# Patient Record
Sex: FEMALE | Race: WHITE | NOT HISPANIC OR LATINO | Employment: OTHER | URBAN - METROPOLITAN AREA
[De-identification: names, ages, dates, MRNs, and addresses within clinical notes are randomized per-mention and may not be internally consistent; named-entity substitution may affect disease eponyms.]

---

## 2017-10-11 ENCOUNTER — HOSPITAL ENCOUNTER (OUTPATIENT)
Dept: RADIOLOGY | Facility: MEDICAL CENTER | Age: 77
End: 2017-10-11
Attending: UROLOGY
Payer: MEDICARE

## 2017-10-11 ENCOUNTER — HOSPITAL ENCOUNTER (OUTPATIENT)
Dept: RADIOLOGY | Facility: MEDICAL CENTER | Age: 77
End: 2017-10-11
Attending: INTERNAL MEDICINE
Payer: MEDICARE

## 2017-10-11 DIAGNOSIS — Z12.31 SCREENING MAMMOGRAM, ENCOUNTER FOR: ICD-10-CM

## 2017-10-11 DIAGNOSIS — R31.0 GROSS HEMATURIA: ICD-10-CM

## 2017-10-11 PROCEDURE — 74176 CT ABD & PELVIS W/O CONTRAST: CPT

## 2017-10-11 PROCEDURE — G0202 SCR MAMMO BI INCL CAD: HCPCS

## 2018-10-16 ENCOUNTER — HOSPITAL ENCOUNTER (OUTPATIENT)
Dept: RADIOLOGY | Facility: MEDICAL CENTER | Age: 78
End: 2018-10-16
Attending: INTERNAL MEDICINE
Payer: MEDICARE

## 2018-10-16 DIAGNOSIS — Z12.31 BREAST CANCER SCREENING BY MAMMOGRAM: ICD-10-CM

## 2018-10-16 PROCEDURE — 77067 SCR MAMMO BI INCL CAD: CPT

## 2019-10-18 ENCOUNTER — HOSPITAL ENCOUNTER (OUTPATIENT)
Dept: RADIOLOGY | Facility: MEDICAL CENTER | Age: 79
End: 2019-10-18
Attending: INTERNAL MEDICINE
Payer: MEDICARE

## 2019-10-18 DIAGNOSIS — Z12.31 VISIT FOR SCREENING MAMMOGRAM: ICD-10-CM

## 2019-10-18 PROCEDURE — 77063 BREAST TOMOSYNTHESIS BI: CPT

## 2020-06-10 ENCOUNTER — OFFICE VISIT (OUTPATIENT)
Dept: MEDICAL GROUP | Facility: MEDICAL CENTER | Age: 80
End: 2020-06-10
Payer: MEDICARE

## 2020-06-10 VITALS
OXYGEN SATURATION: 93 % | BODY MASS INDEX: 28.24 KG/M2 | SYSTOLIC BLOOD PRESSURE: 126 MMHG | HEART RATE: 68 BPM | TEMPERATURE: 98 F | WEIGHT: 175.71 LBS | HEIGHT: 66 IN | DIASTOLIC BLOOD PRESSURE: 64 MMHG

## 2020-06-10 DIAGNOSIS — K76.9 LIVER LESION: ICD-10-CM

## 2020-06-10 DIAGNOSIS — G90.9 AUTONOMIC NEUROPATHY: ICD-10-CM

## 2020-06-10 DIAGNOSIS — L50.1 IDIOPATHIC URTICARIA: ICD-10-CM

## 2020-06-10 DIAGNOSIS — E06.3 HASHIMOTO'S THYROIDITIS: ICD-10-CM

## 2020-06-10 DIAGNOSIS — M15.9 PRIMARY OSTEOARTHRITIS INVOLVING MULTIPLE JOINTS: ICD-10-CM

## 2020-06-10 DIAGNOSIS — N18.30 CHRONIC KIDNEY DISEASE, STAGE 3: ICD-10-CM

## 2020-06-10 DIAGNOSIS — D83.9 CVID (COMMON VARIABLE IMMUNODEFICIENCY) (HCC): ICD-10-CM

## 2020-06-10 PROCEDURE — 99204 OFFICE O/P NEW MOD 45 MIN: CPT | Performed by: INTERNAL MEDICINE

## 2020-06-10 RX ORDER — VITS A,C,E/LUTEIN/MINERALS 300MCG-200
1 TABLET ORAL DAILY
COMMUNITY
End: 2021-06-10

## 2020-06-10 RX ORDER — LEVOTHYROXINE SODIUM 88 MCG
TABLET ORAL
COMMUNITY
Start: 2020-05-06 | End: 2020-10-19 | Stop reason: SDUPTHER

## 2020-06-10 RX ORDER — ACETAMINOPHEN 325 MG/1
650 TABLET ORAL EVERY 4 HOURS PRN
COMMUNITY
End: 2022-05-12

## 2020-06-10 SDOH — HEALTH STABILITY: MENTAL HEALTH: HOW OFTEN DO YOU HAVE A DRINK CONTAINING ALCOHOL?: 4 OR MORE TIMES A WEEK

## 2020-06-10 SDOH — HEALTH STABILITY: MENTAL HEALTH: HOW MANY STANDARD DRINKS CONTAINING ALCOHOL DO YOU HAVE ON A TYPICAL DAY?: 1 OR 2

## 2020-06-10 SDOH — HEALTH STABILITY: MENTAL HEALTH: HOW OFTEN DO YOU HAVE 6 OR MORE DRINKS ON ONE OCCASION?: NEVER

## 2020-06-10 ASSESSMENT — PATIENT HEALTH QUESTIONNAIRE - PHQ9: CLINICAL INTERPRETATION OF PHQ2 SCORE: 0

## 2020-06-10 NOTE — LETTER
[employer]  Pao Claudio D.O.  [address]  [place] [address]  Fax: [phone]   Authorization for Release/Disclosure of   Protected Health Information   Name: ADRIENNE EVANGELISTA : 1940 SSN: [id number]   Address: [address] [place] [id number] Phone:    [phone] (home)    I authorize the entity listed below to release/disclose the PHI below to:   [employer]/Pao Claudio D.O. and Pao Claudio D.O.   Provider or Entity Name:  Dr. Yang ROBLEDO   Address   City, State, [facility]   Phone:      Fax:     Reason for request: continuity of care   Information to be released:    [ x ] LAST COLONOSCOPY,  including any PATH REPORT and follow-up  [  ] LAST FIT/COLOGUARD RESULT [  ] LAST DEXA  [  ] LAST MAMMOGRAM  [  ] LAST PAP  [  ] LAST LABS [  ] RETINA EXAM REPORT  [  ] IMMUNIZATION RECORDS  [ x ] Release all info      [ x ] Check here and initial the line next to each item to release ALL health information INCLUDING  _____ Care and treatment for drug and / or alcohol abuse  _____ HIV testing, infection status, or AIDS  _____ Genetic Testing    DATES OF SERVICE OR TIME PERIOD TO BE DISCLOSED: ____________  I understand and acknowledge that:  * This Authorization may be revoked at any time by you in writing, except if your health information has already been used or disclosed.  * Your health information that will be used or disclosed as a result of you signing this authorization could be re-disclosed by the recipient. If this occurs, your re-disclosed health information may no longer be protected by State or Federal laws.  * You may refuse to sign this Authorization. Your refusal will not affect your ability to obtain treatment.  * This Authorization becomes effective upon signing and will  on (date) __________.      If no date is indicated, this Authorization will  one (1) year from the signature date.    Name: Adrienne Evangelista    Signature:   Date:        6/10/2020       PLEASE FAX REQUESTED RECORDS BACK TO: [phone]

## 2020-06-10 NOTE — ASSESSMENT & PLAN NOTE
Chronic and stable problem.  She does not meet threshold for treatment at this time immunoglobulin.  She continues with routine lab work with her allergist.

## 2020-06-10 NOTE — ASSESSMENT & PLAN NOTE
Chronic and stable problem.  Continue Synthroid 88 mcg daily, last thyroid lab work from July 2019 was normal.  She plans to get her labs updated in July 2020.

## 2020-06-10 NOTE — ASSESSMENT & PLAN NOTE
Chronic and stable problem.  Continue follow-up with allergist, Dr. Duarte, as recommended.  No formal treatments at this time, she uses over-the-counter remedies as needed for flareups.

## 2020-06-10 NOTE — ASSESSMENT & PLAN NOTE
Chronic and stable problem.  She is very mindful of following her kidney function.  Her GFR is remained relatively stable over the past 10 years.  She keeps prescriptions on hand for urinalysis and antibiotics due to her prior history of UTIs and pyelonephritis.  Struggles with getting in adequate water.

## 2020-06-10 NOTE — PROGRESS NOTES
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    HISTORY OF THE PRESENT ILLNESS: Patient is a 79 y.o. female. This pleasant patient is here today to establish care and discuss the below stated chronic medical conditions.  She is unaccompanied for today's visit.    Problem   Hashimoto's thyroiditis now with hypothyroidism    Labs 7/31/2019:  TSH 3.68,thyroxine 7.9, triiodothyronine 81    Occurred when she turned 51. Manifested by weight gain and mood swings. She takes Synthroid 88 mcg daily, previously on 75 mcg until her labs started to become abnormal.      Idiopathic urticaria/mastocytosis    She was diagnosed with mastocytosis around 2000. It took about 9 years until she was able to get over. She works with Dr. Duarte at [facility]. She knows that her symptoms are well controlled at this point due to food avoidance and trigger avoidance.    7/2019-  IgG 815, IgA 184, IgM 118     Chronic Kidney Disease, Stage 3 (Hcc)    Present since at least 2010. GFR ranges from 36-56, average in the high 40's. She has her blood work twice annually. She admits she struggles with drinking water. She has history of UTI's in 2010 and 2015 and she had a complication with pyelonephritis in 2014.  She denies any history of uncontrolled hypertension, type 2 diabetes, nephrolithiasis, or intrinsic kidney disease.  She has a known angiomyolipoma of the kidney.     Autonomic Neuropathy    She was diagnosed with autonomic neuropathy by her allergist a few years ago.  She notes it with golfing, playing tennis, swimming, she would experience attacks.  No formal treatment at this time.     CVID with predominant abnormal B cell  number/function    Diagnosed in 4/2016. She was recommended to go on Hizentra (immune globulin) 10 g weekly SQ but then on recheck she no longer qualified for therapy. Avoidance of triggers has been her mainstay of treatment, mainly dietary avoidance and staying as healthy as possible.  She follows with Dr. Duarte, her allergist in [place].     Primary osteoarthritis involving multiple joints- hip, knee, L spine    She works with Dr. Workman at [facility]. Right > Left knee, Right > left hip, and lower back. She has had a few injections over the years, last injection was in Summer of 2019. She uses acetaminophen 425 mg daily. She tried topical CBD without much noticeable benefit.      Liver lesion- angiomyeolipoma and fatty liver    Liver US (3/2020):  Prior nikolay. 1.7 x 1.3 x 1.5 hyperechoic lesions-- heamngioma. Mild steatosis.     Imaging ordered to follow-up on known liver lesion.  Seems consistent with an angiomyolipoma.  She also has mild fatty liver disease.          Allergies: Codeine and Sulfa drugs    Current Outpatient Medications Ordered in Epic   Medication Sig Dispense Refill   • SYNTHROID 88 MCG Tab      • AZO-CRANBERRY PO Take  by mouth.     • Multiple Vitamins-Minerals (OCUVITE-LUTEIN) Tab Take 1 tablet by mouth every day.     • calcium acetate (PHOS-LO) 667 MG Cap Take 1,334 mg by mouth 3 times a day, with meals. Calcium 500mg once a day     • acetaminophen (TYLENOL) 325 MG Tab Take 650 mg by mouth every four hours as needed.     • ZYRTEC PO 10 mg by Oral route QDAY. Takes daily and prn     • BENADRYL ALLERGY CHILDRENS 12.5 MG PO STRP 25 mg by Oral route PRN      • ATARAX PO 10 mg by Oral route QDAY. Takes daily and prn     • RANITIDINE ACID REDUCER 75 MG PO TABS 75 mg by Oral route BID (RT).      • IBUPROFEN 200 MG PO TABS 200 mg by Oral route PRN      • PRIMATENE MIST INH by Oral route PRN      • EPINEPHRINE 0.5 mL by Intramuscular route PRN Pt has never used her epi pen       No current Epic-ordered  "facility-administered medications on file.        Past Medical History:   Diagnosis Date   • Allergy    • Arthritis    • Cataract    • Kidney disease    • Mastocytosis     medicated   • Thyroid activity decreased     medicated       Past Surgical History:   Procedure Laterality Date   • LUMPECTOMY  1980    left   • ABDOMINAL HYSTERECTOMY TOTAL      1 ovary remaining   • CHOLECYSTECTOMY     • OTHER ABDOMINAL SURGERY      nikolay    • TONSILLECTOMY         Social History     Tobacco Use   • Smoking status: Never Smoker   • Smokeless tobacco: Never Used   • Tobacco comment: continued abstinence   Substance Use Topics   • Alcohol use: Yes     Alcohol/week: 1.2 oz     Types: 2 Cans of beer per week     Frequency: 4 or more times a week     Drinks per session: 1 or 2     Binge frequency: Never     Comment: daily- 2 budlights daily   • Drug use: No       Social History     Social History Narrative    She was born and raised in [place]. She is  to Amarjit for 60 years, they met at work at the bank. 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 She completed her doctorate. She retired at age 72. She has 3 kids- Marc ([place] 58), Sussy ([place] 57), and Edouard ([place] 53). She lives in [place] and [place] in [place]. She is very active with her social life as well as her grandchildren.        Family History   Problem Relation Age of Onset   • Kidney Disease Mother    • Dementia Mother    • Heart Disease Father         2V CABG   • No Known Problems Brother    • No Known Problems Brother        Health Maintenance: Completed    ROS:   As above in the HPI All Others Reviewed and Negative  Objective:     Exam: /64   Pulse 68   Temp 36.7 °C (98 °F) (Temporal)   Ht 1.676 m (5' 6\")   Wt 79.7 kg (175 lb 11.3 oz)   SpO2 93%  Body mass index is 28.36 kg/m².    General: Normal appearing. No distress. Appears " stated age.  EENT: Eyes conjunctiva clear lids without ptosis, extraocular movements intact, ears normal shape and contour, canals are clear bilaterally, tympanic membranes are benign, oropharynx is without erythema, edema or exudates. Good dentition.   Neck: Supple without JVD. Thyroid is not enlarged.  No carotid bruits.  Pulmonary: Clear to ausculation.  Normal effort. No rales, ronchi, or wheezing. No cough.  Cardiovascular: Regular rate and rhythm without murmur. No lower extremity edema bilaterally.  Abdomen: Soft, nontender, nondistended. Normal bowel sounds.   Neurologic: No resting tremor, no increased tone or rigidity.  Lymph: No cervical or supraclavicular lymph nodes are palpable  Skin: Warm and dry.  No obvious lesions on skin exposed areas.  Musculoskeletal: Normal gait. No extremity cyanosis, clubbing, or edema. Patient ambulates independently and without a gait aid.  Psych: Normal mood and affect. Judgment and insight is normal.    Assessment & Plan:   79 y.o. female with the following -    Problem List Items Addressed This Visit     Hashimoto's thyroiditis now with hypothyroidism     Chronic and stable problem.  Continue Synthroid 88 mcg daily, last thyroid lab work from July 2019 was normal.  She plans to get her labs updated in July 2020.         Relevant Medications    SYNTHROID 88 MCG Tab    Idiopathic urticaria/mastocytosis     Chronic and stable problem.  Continue follow-up with allergist, Dr. Duarte, as recommended.  No formal treatments at this time, she uses over-the-counter remedies as needed for flareups.         Chronic kidney disease, stage 3 (HCC)     Chronic and stable problem.  She is very mindful of following her kidney function.  Her GFR is remained relatively stable over the past 10 years.  She keeps prescriptions on hand for urinalysis and antibiotics due to her prior history of UTIs and pyelonephritis.  Struggles with getting in adequate water.         Autonomic neuropathy      Chronic and stable problem.  No formal treatments at this time.  Continue follow-up with allergist as recommended.         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  She does not meet threshold for treatment at this time immunoglobulin.  She continues with routine lab work with her allergist.         Primary osteoarthritis involving multiple joints- hip, knee, L spine     Chronic and stable problem.  Relatively well controlled on once daily acetaminophen.  She follows with Dr. Workman at [facility] for flareups as needed.  She thinks she has had injections in the knee and the hip, last one about a year ago.  She knows to avoid anti-inflammatories with her history of chronic kidney disease.         Relevant Medications    acetaminophen (TYLENOL) 325 MG Tab    Liver lesion- angiomyeolipoma and fatty liver     Chronic and stable problem.  No additional evaluation needed at this time.                Return in about 13 months (around 7/15/2021).    Please note that this dictation was created using voice recognition software. I have made every reasonable attempt to correct obvious errors, but I expect that there are errors of grammar and possibly content that I did not discover before finalizing the note.

## 2020-06-10 NOTE — ASSESSMENT & PLAN NOTE
Chronic and stable problem.  Relatively well controlled on once daily acetaminophen.  She follows with Dr. Workman at [facility] for flareups as needed.  She thinks she has had injections in the knee and the hip, last one about a year ago.  She knows to avoid anti-inflammatories with her history of chronic kidney disease.

## 2020-06-10 NOTE — ASSESSMENT & PLAN NOTE
Chronic and stable problem.  No formal treatments at this time.  Continue follow-up with allergist as recommended.

## 2020-07-30 DIAGNOSIS — E78.5 HYPERLIPIDEMIA, UNSPECIFIED HYPERLIPIDEMIA TYPE: ICD-10-CM

## 2020-07-30 RX ORDER — FENOFIBRATE 54 MG/1
54 TABLET ORAL DAILY
Qty: 90 TAB | Refills: 3 | Status: SHIPPED | OUTPATIENT
Start: 2020-07-30 | End: 2021-06-10 | Stop reason: SDUPTHER

## 2020-07-30 RX ORDER — FENOFIBRATE 54 MG/1
54 TABLET ORAL DAILY
COMMUNITY
End: 2020-07-30 | Stop reason: SDUPTHER

## 2020-10-19 DIAGNOSIS — E06.3 HASHIMOTO'S THYROIDITIS: ICD-10-CM

## 2020-10-19 RX ORDER — LEVOTHYROXINE SODIUM 88 MCG
88 TABLET ORAL
Qty: 90 TAB | Refills: 3 | Status: SHIPPED | OUTPATIENT
Start: 2020-10-19 | End: 2021-04-17

## 2021-01-11 DIAGNOSIS — Z23 NEED FOR VACCINATION: ICD-10-CM

## 2021-06-08 DIAGNOSIS — E78.5 HYPERLIPIDEMIA, UNSPECIFIED HYPERLIPIDEMIA TYPE: ICD-10-CM

## 2021-06-08 RX ORDER — FENOFIBRATE 54 MG/1
54 TABLET ORAL DAILY
Qty: 90 TABLET | Refills: 3 | OUTPATIENT
Start: 2021-06-08

## 2021-06-10 ENCOUNTER — OFFICE VISIT (OUTPATIENT)
Dept: MEDICAL GROUP | Facility: PHYSICIAN GROUP | Age: 81
End: 2021-06-10
Payer: MEDICARE

## 2021-06-10 VITALS
SYSTOLIC BLOOD PRESSURE: 120 MMHG | HEIGHT: 63 IN | RESPIRATION RATE: 12 BRPM | DIASTOLIC BLOOD PRESSURE: 62 MMHG | BODY MASS INDEX: 30.72 KG/M2 | OXYGEN SATURATION: 97 % | TEMPERATURE: 98 F | WEIGHT: 173.4 LBS | HEART RATE: 74 BPM

## 2021-06-10 DIAGNOSIS — E06.3 HASHIMOTO'S THYROIDITIS: ICD-10-CM

## 2021-06-10 DIAGNOSIS — N18.31 STAGE 3A CHRONIC KIDNEY DISEASE: ICD-10-CM

## 2021-06-10 DIAGNOSIS — Z12.31 ENCOUNTER FOR SCREENING MAMMOGRAM FOR BREAST CANCER: ICD-10-CM

## 2021-06-10 DIAGNOSIS — E78.5 HYPERLIPIDEMIA, UNSPECIFIED HYPERLIPIDEMIA TYPE: ICD-10-CM

## 2021-06-10 DIAGNOSIS — E04.1 THYROID NODULE: ICD-10-CM

## 2021-06-10 PROBLEM — R03.0 ELEVATED BLOOD-PRESSURE READING WITHOUT DIAGNOSIS OF HYPERTENSION: Status: RESOLVED | Noted: 2021-06-10 | Resolved: 2021-06-10

## 2021-06-10 PROBLEM — R03.0 ELEVATED BLOOD-PRESSURE READING WITHOUT DIAGNOSIS OF HYPERTENSION: Status: ACTIVE | Noted: 2021-06-10

## 2021-06-10 PROBLEM — R31.29 MICROSCOPIC HEMATURIA: Status: ACTIVE | Noted: 2021-06-10

## 2021-06-10 PROBLEM — R80.9 PROTEINURIA: Status: RESOLVED | Noted: 2021-06-10 | Resolved: 2021-06-10

## 2021-06-10 PROBLEM — R31.29 MICROSCOPIC HEMATURIA: Status: RESOLVED | Noted: 2021-06-10 | Resolved: 2021-06-10

## 2021-06-10 PROBLEM — R80.9 PROTEINURIA: Status: ACTIVE | Noted: 2021-06-10

## 2021-06-10 PROCEDURE — 99214 OFFICE O/P EST MOD 30 MIN: CPT | Performed by: INTERNAL MEDICINE

## 2021-06-10 RX ORDER — FENOFIBRATE 54 MG/1
54 TABLET ORAL DAILY
Qty: 90 TABLET | Refills: 3 | Status: SHIPPED | OUTPATIENT
Start: 2021-06-10 | End: 2022-04-25

## 2021-06-10 RX ORDER — LEVOTHYROXINE SODIUM 88 MCG
88 TABLET ORAL
Qty: 90 TABLET | Refills: 3 | Status: SHIPPED | OUTPATIENT
Start: 2021-06-10 | End: 2022-04-25

## 2021-06-10 RX ORDER — LEVOTHYROXINE SODIUM 88 UG/1
TABLET ORAL
COMMUNITY
End: 2021-06-10 | Stop reason: SDUPTHER

## 2021-06-10 ASSESSMENT — PATIENT HEALTH QUESTIONNAIRE - PHQ9: CLINICAL INTERPRETATION OF PHQ2 SCORE: 0

## 2021-06-10 NOTE — ASSESSMENT & PLAN NOTE
Chronic and ongoing problem.  Latest GFR stable at 51.  She has a known angiomyolipoma and will likely have follow-up CT imaging this year.  She had an episode of dysuria with negative urinalysis for infection but positive for hematuria.  She will follow up with Dr. Tello at [facility] and will reach out to me if his work-up is negative so we can look for other causes.

## 2021-06-10 NOTE — ACP (ADVANCE CARE PLANNING)
Advance Care Planning Note    Discussion date:  6/10/2021  Discussion participants:  patient    The patient wishes to discuss Advanced Care Planning today and the following is a brief summary of our discussion.    Patient has capacity to make her own medical decisons    Advance Directives documents on file:  POLST and Declaration/Living Will    Communication of relevant diagnoses: does not have any pertinent problems on file.      Communication of prognosis: good    Communication of treatment goals/options: continue current level of care    Treatment decisions: continue current level of care    Code status: attempt full resuscitation/full treatment     Time statement:  Total face to face time spent on advanced care planning was 8 minutes with 5 minutes counseling, including the explanation.    Pao Claudio D.O.

## 2021-06-10 NOTE — ASSESSMENT & PLAN NOTE
Chronic and ongoing problem.  Will order thyroid ultrasound of the neck to follow-up on previous history of thyroid nodules.

## 2021-06-10 NOTE — PROGRESS NOTES
Subjective:   Chief Complaint/History of Present Illness:  Adrienne Lopez is a 80 y.o. female established patient who presents today to discuss medical problems as listed below. Adrienne is unaccompanied for today's visit.    Problem   Thyroid Nodule    He has past history of thyroid nodules.  She was planned to have fine-needle aspiration in the past but at the time of the procedure there were no identifiable nodules.  She has been recommended to proceed with follow-up imaging to ensure stability.  She has known Hashimoto's thyroiditis on Synthroid replacement.     Hyperlipidemia    [facility] (4/2021):  Total cholesterol 196, triglycerides 102, HDL 72,     Has history of hypertriglyceridemia and mild elevation in LDL.  She started fenofibrate with improvement of her triglycerides.  No side effects noted at this time.    Current regimen: Fenofibrate 54 mg daily         Hashimoto's thyroiditis now with hypothyroidism    Labs 7/31/2019:  TSH 3.68,thyroxine 7.9, triiodothyronine 81    Occurred when she turned 51. Manifested by weight gain and mood swings. She takes Synthroid 88 mcg daily, previously on 75 mcg until her labs started to become abnormal.      Chronic Kidney Disease, Stage 3 (Hcc)    [facility] (4/2021): GFR 51, Crt 1.04     Present since at least 2010. GFR ranges from 36-56, average in the high 40's. She has her blood work twice annually. She admits she struggles with drinking water. She has history of UTI's in 2010 and 2015 and she had a complication with pyelonephritis in 2014.  She denies any history of uncontrolled hypertension, type 2 diabetes, nephrolithiasis, or intrinsic kidney disease.  She has a known angiomyolipoma of the kidney.    She had an episode of hematuria and has follow-up scheduled with Dr. Tello at [facility] in June 2021.     Elevated Blood-Pressure Reading Without Diagnosis of Hypertension (Resolved)   Microscopic Hematuria (Resolved)   Proteinuria (Resolved)     "    Current Medications:  Current Outpatient Medications Ordered in Epic   Medication Sig Dispense Refill   • Cholecalciferol 2000 UNIT Cap Vitamin D3 50 mcg (2,000 unit) capsule   Take 1 capsule every day by oral route.     • fenofibrate (TRICOR) 54 MG tablet Take 1 tablet by mouth every day. 90 tablet 3   • SYNTHROID 88 MCG Tab Take 1 tablet by mouth every morning on an empty stomach. 90 tablet 3   • AZO-CRANBERRY PO Take  by mouth.     • acetaminophen (TYLENOL) 325 MG Tab Take 650 mg by mouth every four hours as needed.     • ZYRTEC PO 10 mg by Oral route QDAY. Takes daily and prn     • BENADRYL ALLERGY CHILDRENS 12.5 MG PO STRP 25 mg by Oral route PRN      • IBUPROFEN 200 MG PO TABS 200 mg by Oral route PRN      • PRIMATENE MIST INH by Oral route PRN      • EPINEPHRINE 0.5 mL by Intramuscular route PRN Pt has never used her epi pen     • ascorbic acid (VITAMIN C) 1000 MG tablet Vitamin C 1,000 mg tablet   Take 1 tablet twice a day by oral route.       No current [facility]-ordered facility-administered medications on file.          Objective:   Physical Exam:    Vitals: /62 (BP Location: Right arm, Patient Position: Sitting, BP Cuff Size: Adult)   Pulse 74   Temp 36.7 °C (98 °F) (Temporal)   Resp 12   Ht 1.6 m (5' 3\")   Wt 78.7 kg (173 lb 6.4 oz)   SpO2 97%    BMI: Body mass index is 30.72 kg/m².  Physical Exam  Constitutional:       General: She is not in acute distress.     Appearance: Normal appearance. She is not ill-appearing.   HENT:      Right Ear: Tympanic membrane, ear canal and external ear normal. There is no impacted cerumen.      Left Ear: Tympanic membrane, ear canal and external ear normal. There is no impacted cerumen.      Mouth/Throat:      Mouth: Mucous membranes are moist.      Pharynx: Oropharynx is clear.   Eyes:      General: No scleral icterus.     Conjunctiva/sclera: Conjunctivae normal.      Pupils: Pupils are equal, round, and reactive to light.   Cardiovascular:      Rate and " Rhythm: Normal rate and regular rhythm.      Pulses: Normal pulses.      Heart sounds: No murmur heard.     Pulmonary:      Effort: Pulmonary effort is normal. No respiratory distress.      Breath sounds: Normal breath sounds. No wheezing.   Musculoskeletal:      Cervical back: Neck supple. No tenderness.   Lymphadenopathy:      Cervical: No cervical adenopathy.   Skin:     General: Skin is warm and dry.   Psychiatric:         Mood and Affect: Mood normal.         Behavior: Behavior normal.         Thought Content: Thought content normal.         Judgment: Judgment normal.               Assessment and Plan:   Adrienne is a 80 y.o. female with the following:  Problem List Items Addressed This Visit     Hashimoto's thyroiditis now with hypothyroidism     Chronic and ongoing problem.  Continue Synthroid 88 mcg daily.  No signs or symptoms of overtreatment under treatment at this time.         Relevant Medications    SYNTHROID 88 MCG Tab    Chronic kidney disease, stage 3 (HCC)     Chronic and ongoing problem.  Latest GFR stable at 51.  She has a known angiomyolipoma and will likely have follow-up CT imaging this year.  She had an episode of dysuria with negative urinalysis for infection but positive for hematuria.  She will follow up with Dr. Tello at [facility] and will reach out to me if his work-up is negative so we can look for other causes.         Thyroid nodule     Chronic and ongoing problem.  Will order thyroid ultrasound of the neck to follow-up on previous history of thyroid nodules.         Relevant Medications    SYNTHROID 88 MCG Tab    Other Relevant Orders    US-THYROID    Hyperlipidemia     Chronic and ongoing problem.  Continue fenofibrate 54 mg daily.  Prescription renewed.  Labs reviewed from April 2021 and are stable.         Relevant Medications    fenofibrate (TRICOR) 54 MG tablet      Other Visit Diagnoses     Encounter for screening mammogram for breast cancer        Relevant Orders     MA-SCREENING MAMMO BILAT W/TOMOSYNTHESIS W/CAD         POLST form completed and scanned into media.    RTC: Return in about 1 year (around 6/10/2022).    I spent a total of 35 minutes with record review, exam, communication with the patient, communication with other providers, and documentation of this encounter.    PLEASE NOTE: This dictation was created using voice recognition software. I have made every reasonable attempt to correct obvious errors, but I expect that there are errors of grammar and possibly content that I did not discover before finalizing the note.      Pao Claudio, DO  Geriatric and Internal Medicine  [facility]

## 2021-06-10 NOTE — ASSESSMENT & PLAN NOTE
Chronic and ongoing problem.  Continue Synthroid 88 mcg daily.  No signs or symptoms of overtreatment under treatment at this time.

## 2021-06-10 NOTE — ASSESSMENT & PLAN NOTE
Chronic and ongoing problem.  Continue fenofibrate 54 mg daily.  Prescription renewed.  Labs reviewed from April 2021 and are stable.

## 2021-06-23 ENCOUNTER — HOSPITAL ENCOUNTER (OUTPATIENT)
Dept: RADIOLOGY | Facility: MEDICAL CENTER | Age: 81
End: 2021-06-23
Attending: UROLOGY
Payer: MEDICARE

## 2021-06-23 DIAGNOSIS — D17.71 BENIGN LIPOMATOUS NEOPLASM OF KIDNEY: ICD-10-CM

## 2021-06-23 PROCEDURE — 74176 CT ABD & PELVIS W/O CONTRAST: CPT | Mod: MH

## 2021-07-09 ENCOUNTER — TELEPHONE (OUTPATIENT)
Dept: MEDICAL GROUP | Facility: PHYSICIAN GROUP | Age: 81
End: 2021-07-09

## 2021-07-09 NOTE — TELEPHONE ENCOUNTER
Fax came through from [employer] (Fax: [phone]) (Phone: [phone])    Message: TRICOR 54mg Tablet (Fenofibrate Micronized) is off market. The patient has been getting Fenofibrate 54mg, generic for Lofibra. Would you like to change to Lofibra generic?

## 2021-07-13 NOTE — TELEPHONE ENCOUNTER
Phone Number Called: [phone] ([facility])    Call outcome:  Spoke to pharmacist    Message: Pharmacist stated that she will dispense Fenofibrate 54mg; generic Lofibra.

## 2021-07-14 ENCOUNTER — HOSPITAL ENCOUNTER (OUTPATIENT)
Dept: RADIOLOGY | Facility: MEDICAL CENTER | Age: 81
End: 2021-07-14
Attending: INTERNAL MEDICINE
Payer: MEDICARE

## 2021-07-14 DIAGNOSIS — Z12.31 ENCOUNTER FOR SCREENING MAMMOGRAM FOR BREAST CANCER: ICD-10-CM

## 2021-07-14 DIAGNOSIS — E04.1 THYROID NODULE: ICD-10-CM

## 2021-07-14 PROCEDURE — 77063 BREAST TOMOSYNTHESIS BI: CPT

## 2021-07-14 PROCEDURE — 76536 US EXAM OF HEAD AND NECK: CPT

## 2022-05-12 ENCOUNTER — OFFICE VISIT (OUTPATIENT)
Dept: MEDICAL GROUP | Facility: PHYSICIAN GROUP | Age: 82
End: 2022-05-12
Payer: MEDICARE

## 2022-05-12 VITALS
HEART RATE: 70 BPM | WEIGHT: 170.7 LBS | RESPIRATION RATE: 16 BRPM | BODY MASS INDEX: 30.25 KG/M2 | DIASTOLIC BLOOD PRESSURE: 70 MMHG | OXYGEN SATURATION: 95 % | HEIGHT: 63 IN | TEMPERATURE: 98 F | SYSTOLIC BLOOD PRESSURE: 124 MMHG

## 2022-05-12 DIAGNOSIS — E04.1 THYROID NODULE: ICD-10-CM

## 2022-05-12 DIAGNOSIS — N18.31 STAGE 3A CHRONIC KIDNEY DISEASE: ICD-10-CM

## 2022-05-12 DIAGNOSIS — Z23 INFLUENZA VACCINE NEEDED: ICD-10-CM

## 2022-05-12 DIAGNOSIS — E06.3 HASHIMOTO'S THYROIDITIS: ICD-10-CM

## 2022-05-12 DIAGNOSIS — E78.2 MIXED HYPERLIPIDEMIA: ICD-10-CM

## 2022-05-12 DIAGNOSIS — D83.9 CVID (COMMON VARIABLE IMMUNODEFICIENCY) (HCC): ICD-10-CM

## 2022-05-12 PROBLEM — N30.00 ACUTE CYSTITIS: Status: ACTIVE | Noted: 2022-05-12

## 2022-05-12 PROBLEM — E03.9 HYPOTHYROIDISM: Status: RESOLVED | Noted: 2022-05-12 | Resolved: 2022-05-12

## 2022-05-12 PROBLEM — N30.00 ACUTE CYSTITIS: Status: RESOLVED | Noted: 2022-05-12 | Resolved: 2022-05-12

## 2022-05-12 PROBLEM — E03.9 HYPOTHYROIDISM: Status: ACTIVE | Noted: 2022-05-12

## 2022-05-12 PROCEDURE — 99214 OFFICE O/P EST MOD 30 MIN: CPT | Performed by: INTERNAL MEDICINE

## 2022-05-12 RX ORDER — SODIUM FLUORIDE 6.1 MG/ML
GEL, DENTIFRICE DENTAL
COMMUNITY
Start: 2022-02-18

## 2022-05-12 RX ORDER — OMEGA-3/DHA/EPA/FISH OIL 60 MG-90MG
CAPSULE ORAL
COMMUNITY
End: 2022-05-12

## 2022-05-12 RX ORDER — FENOFIBRATE 54 MG/1
54 TABLET ORAL
Qty: 90 TABLET | Refills: 3
Start: 2022-05-13

## 2022-05-12 RX ORDER — LEVOTHYROXINE SODIUM 88 UG/1
TABLET ORAL
COMMUNITY
End: 2022-05-12

## 2022-05-12 RX ORDER — FENOFIBRATE 48 MG/1
TABLET, COATED ORAL
COMMUNITY
End: 2022-05-12

## 2022-05-12 RX ORDER — EPINEPHRINE 0.3 MG/.3ML
INJECTION SUBCUTANEOUS
COMMUNITY
End: 2022-05-12

## 2022-05-12 RX ORDER — CHOLECALCIFEROL (VITAMIN D3) 100000/G
POWDER (GRAM) MISCELLANEOUS
COMMUNITY

## 2022-05-12 RX ORDER — RANITIDINE HCL 75 MG
TABLET ORAL
COMMUNITY
End: 2022-05-12

## 2022-05-12 NOTE — PROGRESS NOTES
Subjective:   Chief Complaint/History of Present Illness:  Adrienne Lopez is a 81 y.o. female established patient who presents today to discuss medical problems as listed below. Adrienne is unaccompanied for today's visit.    Problem   Thyroid Nodule    Thyroid US (7/2021): Moderately suspicious RIGHT mid thyroid nodule measuring 1 cm (TR 4).     ACR TI-RADS Recommendations  TR4 - follow up ultrasound in 1,2,3 and 5 years    He has past history of thyroid nodules.  She was planned to have fine-needle aspiration in the past but at the time of the procedure there were no identifiable nodules.  She has been recommended to proceed with follow-up imaging to ensure stability.  She has known Hashimoto's thyroiditis on Synthroid replacement.     Hyperlipidemia    [facility] (4/2021):  Total cholesterol 196, triglycerides 102, HDL 72,     Has history of hypertriglyceridemia and mild elevation in LDL.  She started fenofibrate with improvement of her triglycerides.  No side effects noted at this time except GFR tends to dip down when she is on higher doses, currently taking 3 days per week.    Current regimen: Fenofibrate 54 mg MWF         Hashimoto's thyroiditis now with hypothyroidism    Labs 7/31/2019:  TSH 3.68,thyroxine 7.9, triiodothyronine 81    Occurred when she turned 51. Manifested by weight gain and mood swings. She takes Synthroid 88 mcg daily, previously on 75 mcg until her labs started to become abnormal.      Chronic Kidney Disease, Stage 3 (Hcc)    [facility] (5/2022): GFR 62, Crt 0.93     Present since at least 2010. GFR ranges from 36-56, average in the high 40's. She has her blood work twice annually. She admits she struggles with drinking water. She has history of UTI's in 2010 and 2015 and she had a complication with pyelonephritis in 2014.  She denies any history of uncontrolled hypertension, type 2 diabetes, nephrolithiasis, or intrinsic kidney disease.  She has a known angiomyolipoma of the  "kidney.    She had an episode of hematuria and has met with Dr. Tello, known angiomyolipoma.     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 She was recommended to go on Hizentra (immune globulin) 10 g weekly SQ but then on recheck she no longer qualified for therapy. Avoidance of triggers has been her mainstay of treatment, mainly dietary avoidance and staying as healthy as possible.  She follows with Dr. Duarte, her allergist in [place].       Current Medications:  Current Outpatient Medications Ordered in Epic   Medication Sig Dispense Refill   • PREVIDENT 5000 DRY MOUTH 1.1 % Gel APPLY THIN RIBBON TO TOOTHBRUSH. BRUSH THROUGHLY FOR 2 MINUTES AT BEDTIME. SPIT OUT RESIDUE. NO RINSING, FOOD OR DRINK FOR AT LEAST 30 MINUTES AFTERWARD.     • Cholecalciferol (VITAMIN D3) 330897 UNIT/GM Powder 1 cap(s)     • Probiotic Product (PROBIOTIC-10 PO) Take  by mouth.     • Influenza PEDS Vaccine Quad pf (STATE-SUPPLIED) injection Inject 0.25 mL into the shoulder, thigh, or buttocks one time for 1 dose. 0.25 mL 0   • [START ON 5/13/2022] fenofibrate (TRICOR) 54 MG tablet Take 1 Tablet by mouth every Monday, Wednesday, and Friday. 90 Tablet 3   • SYNTHROID 88 MCG Tab TAKE 1 TABLET BY MOUTH IN  THE MORNING ON AN EMPTY  STOMACH 100 Tablet 3   • AZO-CRANBERRY PO Take  by mouth.     • ZYRTEC PO 10 mg by Oral route QDAY. Takes daily and prn     • BENADRYL ALLERGY CHILDRENS 12.5 MG PO STRP 25 mg by Oral route PRN      • PRIMATENE MIST INH by Oral route PRN        No current Epic-ordered facility-administered medications on file.          Objective:   Physical Exam:    Vitals: /70 (BP Location: Right arm, Patient Position: Sitting, BP Cuff Size: Adult)   Pulse 70   Temp 36.7 °C (98 °F) (Temporal)   Resp 16   Ht 1.6 m (5' 3\")   Wt 77.4 kg (170 lb 11.2 oz)   SpO2 95%    BMI: Body mass index is 30.24 kg/m².  Physical Exam  Constitutional:       General: She is not in acute distress.     " Appearance: Normal appearance. She is not ill-appearing.   HENT:      Mouth/Throat:      Comments: Normal phonation  Eyes:      General: No scleral icterus.     Conjunctiva/sclera: Conjunctivae normal.   Cardiovascular:      Rate and Rhythm: Normal rate and regular rhythm.      Pulses: Normal pulses.   Pulmonary:      Effort: Pulmonary effort is normal. No respiratory distress.      Breath sounds: No wheezing or rhonchi.   Musculoskeletal:      Right lower leg: No edema.      Left lower leg: No edema.   Skin:     General: Skin is warm and dry.      Findings: No rash.   Psychiatric:         Mood and Affect: Mood normal.         Behavior: Behavior normal.         Thought Content: Thought content normal.         Judgment: Judgment normal.          Assessment and Plan:   Adrienne is a 81 y.o. female with the following:  Problem List Items Addressed This Visit     Hashimoto's thyroiditis now with hypothyroidism     Chronic and ongoing problem.  Will update TSH and free T4 to ensure stability.  Appropriate continue on Synthroid 88 mcg daily.  Discussed that [facility] in [facility] it may be more affordable than the $120 for 90-day supply she is currently pain, she will check into this and let me know if she wants to transfer her prescription.           Relevant Orders    TSH    FREE THYROXINE    Chronic kidney disease, stage 3 (HCC)     Chronic and improved problem.  GFR has trended just above 60.  She is still following her kidney function very closely with her allergist/immunologist, avoid nephrotoxic agents and working well to stay hydrated. Last CT abdomen/pelvis from 6/2021 showed no nephrolithiasis.           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  Continue close follow-up with allergist in [place].  She is recommended to receive a pediatric dose of influenza vaccine, will provide prescription for her.           Thyroid nodule     Previous problem, requires  follow-up, next thyroid ultrasound to follow-up at the 1 year [facility] is due in July 2022.  Order given to her today.           Relevant Orders    US-THYROID    Hyperlipidemia     Chronic ongoing problem, requires follow-up, update lipid level to ensure stability.  Has reduced her fenofibrate 54 mg to 3 days weekly due to previous decline in GFR.           Relevant Medications    fenofibrate (TRICOR) 54 MG tablet (Start on 5/13/2022)    Other Relevant Orders    Lipid Profile    VITAMIN B12      Other Visit Diagnoses     Influenza vaccine needed        Relevant Medications    Influenza PEDS Vaccine Quad pf (STATE-SUPPLIED) injection           RTC: Return in about 1 year (around 5/12/2023).    I spent a total of 38 minutes with record review, exam, communication with the patient, communication with other providers, and documentation of this encounter.    PLEASE NOTE: This dictation was created using voice recognition software. I have made every reasonable attempt to correct obvious errors, but I expect that there are errors of grammar and possibly content that I did not discover before finalizing the note.      Pao Claudio, DO  Geriatric and Internal Medicine  [facility]

## 2022-05-12 NOTE — ASSESSMENT & PLAN NOTE
Chronic and ongoing problem.  Continue close follow-up with allergist in [place].  She is recommended to receive a pediatric dose of influenza vaccine, will provide prescription for her.

## 2022-05-12 NOTE — ASSESSMENT & PLAN NOTE
Chronic and ongoing problem.  Will update TSH and free T4 to ensure stability.  Appropriate continue on Synthroid 88 mcg daily.  Discussed that [facility] in [facility] it may be more affordable than the $120 for 90-day supply she is currently pain, she will check into this and let me know if she wants to transfer her prescription.

## 2022-05-12 NOTE — ASSESSMENT & PLAN NOTE
Chronic ongoing problem, requires follow-up, update lipid level to ensure stability.  Has reduced her fenofibrate 54 mg to 3 days weekly due to previous decline in GFR.

## 2022-05-12 NOTE — ASSESSMENT & PLAN NOTE
Previous problem, requires follow-up, next thyroid ultrasound to follow-up at the 1 year drake is due in July 2022.  Order given to her today.

## 2022-06-14 ENCOUNTER — HOSPITAL ENCOUNTER (OUTPATIENT)
Dept: RADIOLOGY | Facility: MEDICAL CENTER | Age: 82
End: 2022-06-14
Attending: INTERNAL MEDICINE
Payer: MEDICARE

## 2022-06-14 DIAGNOSIS — E04.1 THYROID NODULE: ICD-10-CM

## 2022-06-14 PROCEDURE — 76536 US EXAM OF HEAD AND NECK: CPT

## 2022-06-29 ENCOUNTER — TELEPHONE (OUTPATIENT)
Dept: MEDICAL GROUP | Facility: PHYSICIAN GROUP | Age: 82
End: 2022-06-29
Payer: COMMERCIAL

## 2022-06-29 DIAGNOSIS — R93.89 ABNORMAL ULTRASOUND OF CAROTID ARTERY: ICD-10-CM

## 2022-06-30 NOTE — TELEPHONE ENCOUNTER
1. Caller Name: Adrienne Bibiana Thompson                          Call Back Number: [phone] (home)         How would the patient prefer to be contacted with a response: Phone call OK to leave a detailed message    Brionna left a message  She wanted to know if you think she needs the carotid test, if so she will schedule it?   Dr. Felicia Caban  Thyroid results ?

## 2022-08-10 ENCOUNTER — HOSPITAL ENCOUNTER (OUTPATIENT)
Dept: RADIOLOGY | Facility: MEDICAL CENTER | Age: 82
End: 2022-08-10
Attending: INTERNAL MEDICINE
Payer: MEDICARE

## 2022-08-10 DIAGNOSIS — Z12.31 VISIT FOR SCREENING MAMMOGRAM: ICD-10-CM

## 2022-08-10 DIAGNOSIS — R93.89 ABNORMAL ULTRASOUND OF CAROTID ARTERY: ICD-10-CM

## 2022-08-10 PROCEDURE — 77063 BREAST TOMOSYNTHESIS BI: CPT

## 2022-08-10 PROCEDURE — 93880 EXTRACRANIAL BILAT STUDY: CPT

## 2023-08-18 ENCOUNTER — APPOINTMENT (OUTPATIENT)
Dept: RADIOLOGY | Facility: MEDICAL CENTER | Age: 83
End: 2023-08-18
Attending: INTERNAL MEDICINE
Payer: COMMERCIAL

## 2023-11-01 ENCOUNTER — TELEPHONE (OUTPATIENT)
Dept: HEALTH INFORMATION MANAGEMENT | Facility: OTHER | Age: 83
End: 2023-11-01